# Patient Record
(demographics unavailable — no encounter records)

---

## 2017-01-20 NOTE — PATHOLOGY
PATHOLOGY REPORT 

 

*    *    *    *    *    *    *    * 

 FINAL DIAGNOSIS:

A.  Terminal ileum biopsy:

- Segment of colonic mucosa showing collagenous colitis.    

B.  Random colon biopsy:

- Collagenous colitis.

 

COMMENT:

Sections of the terminal ileum and random colon biopsies appear

similar and reveal segments of colonic mucosa showing a mild chronic

active colitis without crypt architectural distortion.  Collagen is

deposited around the absorptive capillary complex beneath the surface

epithelium.  The findings are supportive of the diagnosis of

collagenous colitis.

(JPM:mgerika; d/t: 17) 

 

 

REPORT ELECTRONICALLY SIGNED BY:   Saúl Quintana M.D.

DATE/TIME:   2017 13:24

*    *    *    *    *    *    *    *

 

GROSS PATHOLOGY:

A.  Received in formalin labeled "Simona Chan and terminal ileum bx,"

is a segment of tan soft tissue measuring 0.3 cm in maximum

dimension.  The specimen is submitted entirely in cassette A1.

B.  Received in formalin labeled "Simona Chan and random colon bx,"

are 7 segments of tan soft tissue measuring 1.9 x 0.5 x 0.3 cm in

aggregate dimensions and ranging from 0.2 to 0.7 cm in maximum

dimension.  The specimen is submitted entirely in cassette B1.

(TTL; 2017)

 

 

 INITIAL CPT CODE(S):

A; 38942

B; 40514

Professional services performed by [facility] [place] 

[place] [address]

 

  Technical services performed by [facility] at [address] [place] [address]

[address] [address].

  

 SPECIMEN(S) RECEIVED:

A.Terminal ileum biopsy

B.Random colon biopsy

 

 CLINICAL HISTORY:

Diarrhea 

 

 PATIENT:  SIMONA CHAN

/AGE:  1937 (Age: 79)  

PATIENT #:  [id number]

ACCESSION #:  [id number]          ALT CASE #:   

SPECIMEN COLLECTION DATE:  2017

SPECIMEN RECEIVED DATE:  2017

   

[facility] - [address] [place] [address] - PHONE: 

[phone]

* * *  END OF REPORT  * * *

## 2017-08-03 NOTE — RAD
DATE: 8/3/2017



EXAM: MAMMO DELIA SCREENING BILATERAL



HISTORY: Routine screening



COMPARISON: 6/27/2016, 7/7/2016





The breast parenchyma shows scattered fibroglandular densities. Breast

parenchyma level B.



FINDINGS: 2-D and 3-D tomosynthesis imaging was performed in CC and MLO

projections. No new or enlarging breast densities are seen. Scattered benign

type calcifications are present. No suspicious microcalcifications have

developed.  





IMPRESSION: Stable mammograms without evidence of malignancy.







BI-RADS CATEGORY: 2 BENIGN FINDING(S)



RECOMMENDED FOLLOW-UP: 12M 12 MONTH FOLLOW-UP



PQRS compliance statement: Patient information was entered into a reminder

system with a target due date     for the next mammogram.



Mammography is a sensitive method for finding small breast cancers, but it

does not detect them all and is not a substitute for careful clinical

examination.  A negative mammogram does not negate a clinically suspicious

finding and should not result in delay in biopsying a clinically suspicious

abnormality.



"Our facility is accredited by the American [employer] Mammography

Program."

## 2017-08-11 NOTE — PDOC
MODERATE SEDATION ASSESSMENT


RISKS/ALTERNATIVES


Risks/Alternatives


Risks and alternatives of this type of sedation and procedure discussed with:


RISK/ALTERNATIVES:  Patient





H & P ON CHART


H & P


H & P on chart and reviewed for co-morbid conditions and appropriate labs.


H&P ON CHART:  Yes





PREGNANCY STATUS


PREG STATUS ASSESSED:  N/A





MEDS/ALLERGIES REVIEWED


Meds/Allergies Reviewed


Medications and Allergies including time and route of recently administered 

narcotics and sedatives.


MEDS/ALLERGIES REVIEWED:  Yes





ASA RATING


ASA RATING:  II





AIRWAY ASSESSMENT


Airway Assessment


Airway patency, oral function limitations, presence  of caps, crowns, dentures, 

partials, and ability to extend neck assessed.


AIRWAY ASSESSMENT:  Yes





MALLAMPATI SCORE


MALLAMPATI SCORE:  II





PRE-SEDATION ASSESSMENT


PRE-SEDATION ASSESSMENT:  Yes











REAGAN FRAIRE MD Aug 11, 2017 10:10

## 2017-08-11 NOTE — CARD
--------------- APPROVED REPORT --------------





EXAM: Two-dimensional and M-mode echocardiogram with Doppler and color Doppler.



Other Information 

Quality : Good



INDICATION

Hypertension/HCVD



2D DIMENSIONS 

RVDd2.0 (2.9-3.5cm)Left Atrium(2D)2.5 (1.6-4.0cm)

IVSd1.2 (0.7-1.1cm)Aortic Root(2D)3.4 (2.0-3.7cm)

LVDd4.5 (3.9-5.9cm)LVOT Diameter2.1 (1.8-2.4cm)

PWd1.1 (0.7-1.1cm)LVDs2.9 (2.5-4.0cm)

FS (%) 30.0 %SV62.0 ml

LVEF(%)60.0 (>50%)



Aortic Valve

AoV Peak Stan.107.3cm/sAoV VTI23.3cm

AO Peak GR.4.6mmHgLVOT  VTI 18.04cm

AO Mean GR.2mmHgAVA   (VTI)2.70cm2



Mitral Valve

MV E Cwgydzwn45.5cm/sMV DECEL HZUB518ds

MV A Pznhggtj62.9cm/sE/A  Ratio0.7



TDI

Lateral E' P. V5.73cm/sMedial E' P. V3.41cm/s

E/Lateral E'10.2E/Medial E'17.2



Tricuspid Valve

TR P. Amgfjfte516fu/sRAP XXPYFUEH2jaEt

TR Peak Gr.32qdLkYBRS04lyPf



Pulmonary Vein

S1 Xyfvxulf69.5cm/sS2 Dayfotpq87.44cm/s

D2 Lznvjzxu91.4cm/sPVa yseugewz929krin



 LEFT VENTRICLE 

The left ventricle is normal size. There is mild concentric left ventricular hypertrophy. The left ve
ntricular systolic function is normal and the ejection fraction is within normal range. The Ejection 
Fraction is 55-60%. Septal motion consistent with conduction abnormality. Transmitral Doppler flow pa
ttern is Grade I-abnormal relaxation pattern.



 RIGHT VENTRICLE 

The right ventricle is normal size. The right ventricular systolic function is normal.



 ATRIA 

The left atrium size is normal. The right atrium size is normal. The interatrial septum is intact wit
h no evidence for an atrial septal defect or patent foramen ovale as noted on 2-D or Doppler imaging.




 AORTIC VALVE 

The aortic valve is normal in structure and function. Doppler and Color Flow revealed no significant 
aortic regurgitation. There is no significant aortic valvular stenosis.



 MITRAL VALVE 

The mitral valve is calcified but opens well. There is no evidence of mitral valve prolapse. There is
 no mitral valve stenosis. Doppler and Color-flow revealed mild mitral regurgitation.



 TRICUSPID VALVE 

The tricuspid valve is normal in structure and function. Doppler and Color Flow revealed trace tricus
pid regurgitation. The PA pressure was estimated at 30 mmHg. There is no tricuspid valve stenosis.



 PULMONIC VALVE 

The pulmonary valve is normal in structure and function. Doppler and Color Flow revealed trace to mil
d pulmonic valvular regurgitation. There is no pulmonic valvular stenosis.



 GREAT VESSELS 

The aortic root is normal in size. The ascending aorta is normal in size. The IVC is normal in size a
nd collapses >50% with inspiration.



 PERICARDIAL EFFUSION 

There is no evidence of significant pericardial effusion.



Critical Notification

Critical Value: No



<Conclusion>

The left ventricle is normal size.

The left ventricular systolic function is normal and the ejection fraction is within normal range.

The Ejection Fraction is 55-60%.

There is mild concentric left ventricular hypertrophy.

There is no significant aortic valvular stenosis.

Doppler and Color Flow revealed no significant aortic regurgitation.

Doppler and Color-flow revealed mild mitral regurgitation.

Doppler and Color Flow revealed trace tricuspid regurgitation.

The PA pressure was estimated at 30 mmHg.

## 2017-08-11 NOTE — CARD
--------------- APPROVED REPORT --------------





Patient StatusOUT-PATIENT

Cardiac Technologist:      Cali Rodríguez, RT (R)

Procedure(s) performed: 1.  Aortogram with bilateral lower extremity runoff

2.  Successful orbital atherectomy/PTA to right superficial femoral artery and successful balloon ang
ioplasty to right peroneal artery

Sedation time: 112  min



INDICATION FOR PROCEDURE

The indication(s) include : Peripheral vascular disease with claudication.



PROCEDURE NARRATIVE

After explaining the risks, benefits and alternative options, informed consent was obtained from gisela
ent. Patient was brought to the cardiac Cath Lab and both her groins were prepped and draped in the u
sual fashion. 20 mL of 2% lidocaine was infiltrated into the skin and subcutaneous tissues of the rig
ht groin for local anesthesia. Arterial access was obtained in the right common femoral artery and a 
5 Finnish sheath was inserted. 5 Finnish pigtail catheter was used to perform aortogram with bilateral 
lower extremity runoff. A 5 Finnish crossover catheter was advanced over the aortic leida and with th
e tip positioned in the left common iliac artery, selective left lower extremity angiography was perf
ormed. Contrast injections were performed through the sheath in the right groin for right lower extre
mity angiography. The following findings were noted.



FINDINGS

1. No significant stenosis involving the distal descending aorta, bilateral common iliac and external
 iliac arteries.

2.  No significant stenosis involving bilateral common femoral arteries. The right superficial femora
l artery showed calcified 90% stenosis in the distal segment. The left superficial femoral artery did
 not show any significant stenosis.

3.  No significant stenosis noted bilateral popliteal arteries. There is one vessel runoff below the 
knee bilaterallyvia the peroneal arteries with chronic total occlusion of anterior and posterior tibi
al arteries bilaterally.



INTERVENTION

After infiltrating the skin and subcutis tissues of the left groin with local anesthetic, arterial ac
cess was obtained in the left common femoral artery and a 6 Finnish 45 cm destination sheath is insert
ed. This was advanced over the aortic leida with the help of a crossover catheter and the tip was po
sitioned in the proximal segment of the right superficial femoral artery. The stenoses in the superfi
cial femoral and peroneal arteries were crossed with a 0.014 inch command guidewire. The peroneal art
myrtle stenosis was dilated with a 2.5 x 16 mm followed by 3.0 x 1 20 mm [place] balloons. Subsequ
ently, after exchanging the guidewire to a 0.014 inch viper guidewire, multiple orbital atherectomy p
asses were performed within the lesion in the superficial femoral artery using 1.5 CSI [place]ag athere
ctomy angela.  The stenosis was then dilated with a 5.0 x 40 mm [place] balloon. Follow-up angior
rhaphy showed resolution of both the stenoses with good distal flow. Patient tolerated the procedure 
well. Hemostasis in both her groins were achieved using Perclose suture closure device. There were no
 immediate complications.



Conclusion

1.  Bilateral lower extremity peripheral vascular disease as described above.

2.  Successful orbital atherectomy/PTA to the right superficial femoral artery and successful balloon
 angioplasty to the right peroneal artery.

## 2017-08-12 NOTE — PDOC3
REBECCA RICK APRN 8/12/17 1000:


Discharge Summary


Visit Information


Date of Admission:  Aug 11, 2017


Date of Discharge:  Aug 12, 2017


Admitting Diagnosis:  Severe PAD with claudications


Final Diagnosis


Severe PAD with LLE percutaneous revascularization





Brief Hospital Course


Allergies





 Allergies








Coded Allergies Type Severity Reaction Last Updated Verified


 


  No Known Drug Allergies    1/19/17 No








Vital Signs





Vital Signs








  Date Time  Temp Pulse Resp B/P (MAP) Pulse Ox O2 Delivery O2 Flow Rate FiO2


 


8/12/17 08:00      Room Air 2.0 


 


8/12/17 07:10 98.0 93 20 135/61 (85) 96   





 98.0       








Lab Results





Laboratory Tests








Test


  8/11/17


06:40 8/11/17


07:02


 


Prothrombin Time


  12.8 SEC


(11.7-14.0) 


 


 


Prothromb Time International


Ratio 1.0 (0.8-1.1) 


  


 


 


Activated Partial


Thromboplast Time 29 SEC (24-38) 


  


 


 


White Blood Count


  


  5.6 x10^3/uL


(4.0-11.0)


 


Red Blood Count


  


  4.09 x10^6/uL


(3.50-5.40)


 


Hemoglobin


  


  12.9 g/dL


(12.0-15.5)


 


Hematocrit


  


  38.1 %


(36.0-47.0)


 


Mean Corpuscular Volume  93 fL () 


 


Mean Corpuscular Hemoglobin  32 pg (25-35) 


 


Mean Corpuscular Hemoglobin


Concent 


  34 g/dL


(31-37)


 


Red Cell Distribution Width


  


  13.6 %


(11.5-14.5)


 


Platelet Count


  


  234 x10^3/uL


(140-400)


 


Sodium Level


  


  142 mmol/L


(136-145)


 


Potassium Level


  


  4.4 mmol/L


(3.5-5.1)


 


Chloride Level


  


  105 mmol/L


()


 


Carbon Dioxide Level


  


  27 mmol/L


(21-32)


 


Anion Gap  10 (6-14) 


 


Blood Urea Nitrogen


  


  46 mg/dL


(7-20)


 


Creatinine


  


  1.7 mg/dL


(0.6-1.0)


 


Estimated GFR


(Cockcroft-Gault) 


  28.9 


 


 


Glucose Level


  


  90 mg/dL


(70-99)


 


Calcium Level


  


  9.6 mg/dL


(8.5-10.1)








Brief Hospital Course


Ms. Montgomery  is a n 81 yo female admitted for planned femoral arteriogram for 

noted PAD with claudication symptoms. With femoral arteriogram via right 

transfemoral approach, she was noted with bilateral PAD with significant 

stenosis to RLE arterial system. Successful orbital atherectomy/PTA to the 

right superficial femoral artery and successful balloon angioplasty to the 

right peroneal artery were performed without complications. She did well 

overnight, VSS, and complains of significant discomfort. She has been 

ambulatory without difficulty. Right groin arteriotomy iste is intact without 

erythema, swelling and neurovascular status to bilateral LE is intact.  She is 

notable for renal insufficiency in which she will have have repeat BMP as an 

outpt and to seen byt her PCP in 1-2 weeks.  Discussed post arteriogram 

instructions to be reinforced by staff.  Pt is to start on plavix to be taken 

for only a month and ECASA 81 mg indefinitely otherwise she is to continnue her 

home meds. Pt is to follow up in office in 3-4 weeks.





Discharge Information


Condition at Discharge:  Stable


Follow Up:  Weeks (4)


Disposition/Orders:  D/C to Home


Scheduled


Aspirin (Aspirin Ec), 81 MG PO DAILYWBKFT


Atorvastatin Calcium (Atorvastatin Calcium), 10 MG PO HS, (Reported)


Clopidogrel Bisulfate (Clopidogrel), 75 MG PO DAILYWBKFT


Losartan/Hydrochlorothiazide (Losartan-Hctz 100-12.5 Mg Tab), 1 EACH PO DAILY, (

Reported)


Naproxen Sodium (Aleve), 220 MG PO BID, (Reported)





Miscellaneous Medications


Calcium Carbonate (Calcium), 500 MG PO, (Reported)





Patient Instructions


Patient Instructions


GENERAL INSTRUCTIONS:





1.   Your dressing should be removed prior to leaving the hospital.


2.   It is OK to shower the day after your procedure.


3.   If you received stents, be sure to carry your stent information card with 

you in your wallet/purse at all times.


4.   Call the office immediately at [phone] if you notice any fever or if 

there is redness, worsening tenderness/pain, increased bruising, or drainage   

                               from the puncture site.    


5.   Should you have bleeding from the site, lie down immediately & put 

pressure on the site.  The pressure should be hard enough to stop the bleeding. 


       Have the nearest person call 911.  DO NOT try to drive to the ER with 

active bleeding.   


6.   If you notice a change in color, coolness to touch, or loss of feeling in 

the affected extremity, come to the emergency room.  Please have someone  


      drive you or call 911 if no one is available.  DO NOT drive yourself.


7.   If you normally take glucophage (metformin), please do not take this 

medicine for 48 hours following your procedure.


8.   DO NOT STOP TAKING YOUR PLAVIX OR ASPIRIN UNLESS IT IS CLEARED BY A 

REPRESENTATIVE OF YOUR CARDIOLOGIST AT OUR  


      OFFICE.


9.   QUIT SMOKING: the American Heart Association, American Lung Association, & 

American Cancer Society have cessation resources available on  


      their websites


10.   Please have someone available to drive you home from the hospital as you 

may be limited by sedation medications given during the procedure.





Femoral (Groin) access:


1.   Do no lifting, pushing, pulling, bending, stooping, or recurrent stair 

climbing for 3 days following your procedure.


2.   Once past the first 3 days, do not do any HEAVY exertion or lifting for 

one week following the procedure.  No gym workouts, running, lifting greater  


      than a gallon of milk, etc


3.   Do not submerge in bath or pool for one week.


      OK to drive 3 days following your procedure, but if going long distance, 

do not go alone & take hourly breaks to get out of car and walk around.





Call the office at [phone] for any questions or concerns.





THU BECKWITH MD 8/12/17 1943:


Discharge Summary


Brief Hospital Course


Brief Hospital Course


Pt. seen and examined. Agree with above NP note. 


Ok to DC home today. F/u in the office.





Discharge Information


Scheduled


Aspirin (Aspirin Ec), 81 MG PO DAILYWBKFT


Atorvastatin Calcium (Atorvastatin Calcium), 10 MG PO HS, (Reported)


Clopidogrel Bisulfate (Clopidogrel), 75 MG PO DAILYWBKFT


Losartan/Hydrochlorothiazide (Losartan-Hctz 100-12.5 Mg Tab), 1 EACH PO DAILY, (

Reported)


Naproxen Sodium (Aleve), 220 MG PO BID, (Reported)





Miscellaneous Medications


Calcium Carbonate (Calcium), 500 MG PO, (Reported)











REBECCA RICK Aug 12, 2017 10:00


THU BECKWITH MD Aug 12, 2017 19:43